# Patient Record
Sex: FEMALE | Race: ASIAN | NOT HISPANIC OR LATINO
[De-identification: names, ages, dates, MRNs, and addresses within clinical notes are randomized per-mention and may not be internally consistent; named-entity substitution may affect disease eponyms.]

---

## 2020-08-17 PROBLEM — Z00.00 ENCOUNTER FOR PREVENTIVE HEALTH EXAMINATION: Status: ACTIVE | Noted: 2020-08-17

## 2020-08-21 ENCOUNTER — APPOINTMENT (OUTPATIENT)
Dept: OBGYN | Facility: CLINIC | Age: 20
End: 2020-08-21
Payer: MEDICAID

## 2020-08-21 ENCOUNTER — ASOB RESULT (OUTPATIENT)
Age: 20
End: 2020-08-21

## 2020-08-21 VITALS
HEART RATE: 63 BPM | WEIGHT: 116 LBS | TEMPERATURE: 98 F | HEIGHT: 66 IN | BODY MASS INDEX: 18.64 KG/M2 | DIASTOLIC BLOOD PRESSURE: 69 MMHG | SYSTOLIC BLOOD PRESSURE: 107 MMHG

## 2020-08-21 DIAGNOSIS — N92.6 IRREGULAR MENSTRUATION, UNSPECIFIED: ICD-10-CM

## 2020-08-21 DIAGNOSIS — Z11.3 ENCOUNTER FOR SCREENING FOR INFECTIONS WITH A PREDOMINANTLY SEXUAL MODE OF TRANSMISSION: ICD-10-CM

## 2020-08-21 DIAGNOSIS — Z86.79 PERSONAL HISTORY OF OTHER DISEASES OF THE CIRCULATORY SYSTEM: ICD-10-CM

## 2020-08-21 DIAGNOSIS — Z87.891 PERSONAL HISTORY OF NICOTINE DEPENDENCE: ICD-10-CM

## 2020-08-21 DIAGNOSIS — Z80.3 FAMILY HISTORY OF MALIGNANT NEOPLASM OF BREAST: ICD-10-CM

## 2020-08-21 DIAGNOSIS — Z78.9 OTHER SPECIFIED HEALTH STATUS: ICD-10-CM

## 2020-08-21 PROCEDURE — 99385 PREV VISIT NEW AGE 18-39: CPT | Mod: 25

## 2020-08-21 PROCEDURE — 90651 9VHPV VACCINE 2/3 DOSE IM: CPT

## 2020-08-21 PROCEDURE — 76830 TRANSVAGINAL US NON-OB: CPT

## 2020-08-21 PROCEDURE — 90471 IMMUNIZATION ADMIN: CPT

## 2020-08-21 PROCEDURE — 81025 URINE PREGNANCY TEST: CPT

## 2020-08-21 PROCEDURE — 76857 US EXAM PELVIC LIMITED: CPT | Mod: 59

## 2020-08-23 LAB
ESTRADIOL SERPL-MCNC: 81 PG/ML
FSH SERPL-MCNC: 4.3 IU/L
HBV SURFACE AG SER QL: NONREACTIVE
HCG UR QL: NEGATIVE
HCV AB SER QL: NONREACTIVE
HCV S/CO RATIO: 0.19 S/CO
HIV1+2 AB SPEC QL IA.RAPID: NONREACTIVE
LH SERPL-ACNC: 21 IU/L
PROGEST SERPL-MCNC: 0.4 NG/ML
PROLACTIN SERPL-MCNC: 7.2 NG/ML
QUALITY CONTROL: YES
T PALLIDUM AB SER QL IA: NEGATIVE
TSH SERPL-ACNC: 2.72 UIU/ML

## 2020-08-25 ENCOUNTER — TRANSCRIPTION ENCOUNTER (OUTPATIENT)
Age: 20
End: 2020-08-25

## 2020-08-25 PROBLEM — Z87.891 CESSATION OF TOBACCO USE IN PREVIOUS 12 MONTHS: Status: ACTIVE | Noted: 2020-08-25

## 2020-08-25 PROBLEM — Z78.9 EXERCISES OCCASIONALLY: Status: ACTIVE | Noted: 2020-08-21

## 2020-08-25 PROBLEM — Z86.79 HISTORY OF HYPOTENSION: Status: RESOLVED | Noted: 2020-08-25 | Resolved: 2020-08-25

## 2020-08-25 PROBLEM — Z11.3 SCREENING FOR STD (SEXUALLY TRANSMITTED DISEASE): Status: ACTIVE | Noted: 2020-08-25

## 2020-08-25 PROBLEM — N92.6 IRREGULAR PERIODS/MENSTRUAL CYCLES: Status: ACTIVE | Noted: 2020-08-21

## 2020-08-25 PROBLEM — Z80.3 FAMILY HISTORY OF MALIGNANT NEOPLASM OF BREAST: Status: ACTIVE | Noted: 2020-08-21

## 2020-08-25 LAB
C TRACH RRNA SPEC QL NAA+PROBE: NOT DETECTED
N GONORRHOEA RRNA SPEC QL NAA+PROBE: NOT DETECTED
SOURCE AMPLIFICATION: NORMAL

## 2020-08-25 NOTE — CHIEF COMPLAINT
[Initial Visit] : initial GYN visit [FreeTextEntry1] : Pt is here for initial annual exam. Overall pt feels good, denies pelvic pain. Pt states she has vaginal discharge and some odor for 1 month. Pt also has an irregular cycles with a normal flow , but pt states she has very bad cramping the first 2 days of cycle. \par \par Gardasil injection: \par NDC:[id number]\par Lot#: [id number]\par Exp: [id number]\par

## 2020-08-25 NOTE — PHYSICAL EXAM
[Awake] : awake [Alert] : alert [Acute Distress] : no acute distress [LAD] : no lymphadenopathy [Mass] : no breast mass [Nipple Discharge] : no nipple discharge [Axillary LAD] : no axillary lymphadenopathy [Soft] : soft [Tender] : non tender [Distended] : not distended [Oriented x3] : oriented to person, place, and time [Depressed Mood] : not depressed [No Lesions] : no genitalia lesions [Normal] : external genitalia [Labia Majora] : labia major [Labia Minora] : labia minora [Pink Rugae] : pink rugae [Discharge] : had no discharge [Pap Obtained] : a Pap smear was performed [Motion Tenderness] : there was no cervical motion tenderness [Tenderness] : nontender [Enlarged ___ wks] : not enlarged [Mass ___ cm] : no uterine mass was palpated [Uterine Adnexae] : were not tender and not enlarged

## 2020-08-25 NOTE — HISTORY OF PRESENT ILLNESS
[Definite:  ___ (Date)] : the last menstrual period was [unfilled] [Sexually Active] : is sexually active [Menarche Age: ____] : age at menarche was [unfilled] [Male ___] : [unfilled] male [Yes] : yes [Regular Cycle Intervals] : periods have been irregular [Contraception] : does not use contraception

## 2020-08-26 LAB
HPV HIGH+LOW RISK DNA PNL CVX: NOT DETECTED
SOURCE AMPLIFICATION: NORMAL
T VAGINALIS RRNA SPEC QL NAA+PROBE: NOT DETECTED

## 2020-08-27 LAB
TESTOST BND SERPL-MCNC: 2.5 PG/ML
TESTOST SERPL-MCNC: 48.8 NG/DL

## 2020-08-28 LAB — ESTROGEN SERPL-MCNC: 195 PG/ML

## 2020-09-04 RX ORDER — FLUCONAZOLE 150 MG/1
150 TABLET ORAL
Qty: 1 | Refills: 1 | Status: ACTIVE | COMMUNITY
Start: 2020-09-04 | End: 1900-01-01

## 2020-09-04 RX ORDER — TERCONAZOLE 8 MG/G
0.8 CREAM VAGINAL
Qty: 1 | Refills: 1 | Status: ACTIVE | COMMUNITY
Start: 2020-09-04 | End: 1900-01-01

## 2020-10-23 ENCOUNTER — LABORATORY RESULT (OUTPATIENT)
Age: 20
End: 2020-10-23

## 2020-10-23 ENCOUNTER — APPOINTMENT (OUTPATIENT)
Dept: OBGYN | Facility: CLINIC | Age: 20
End: 2020-10-23
Payer: MEDICAID

## 2020-10-23 VITALS
WEIGHT: 116 LBS | DIASTOLIC BLOOD PRESSURE: 72 MMHG | SYSTOLIC BLOOD PRESSURE: 93 MMHG | HEART RATE: 101 BPM | HEIGHT: 66 IN | TEMPERATURE: 96.3 F | BODY MASS INDEX: 18.64 KG/M2

## 2020-10-23 DIAGNOSIS — R39.15 URGENCY OF URINATION: ICD-10-CM

## 2020-10-23 DIAGNOSIS — Z86.19 PERSONAL HISTORY OF OTHER INFECTIOUS AND PARASITIC DISEASES: ICD-10-CM

## 2020-10-23 PROCEDURE — 99072 ADDL SUPL MATRL&STAF TM PHE: CPT

## 2020-10-23 PROCEDURE — 99213 OFFICE O/P EST LOW 20 MIN: CPT | Mod: 25

## 2020-10-23 PROCEDURE — 81025 URINE PREGNANCY TEST: CPT

## 2020-10-23 PROCEDURE — 90649 4VHPV VACCINE 3 DOSE IM: CPT

## 2020-10-23 PROCEDURE — 90471 IMMUNIZATION ADMIN: CPT

## 2020-10-23 PROCEDURE — 81003 URINALYSIS AUTO W/O SCOPE: CPT | Mod: QW

## 2020-10-23 RX ORDER — FLUCONAZOLE 150 MG/1
150 TABLET ORAL
Qty: 1 | Refills: 0 | Status: ACTIVE | COMMUNITY
Start: 2020-10-23 | End: 1900-01-01

## 2020-10-24 LAB
BILIRUB UR QL STRIP: NORMAL
CLARITY UR: CLEAR
COLLECTION METHOD: NORMAL
GLUCOSE UR-MCNC: NORMAL
HCG UR QL: 0.2 EU/DL
HCG UR QL: NEGATIVE
HGB UR QL STRIP.AUTO: NORMAL
KETONES UR-MCNC: NORMAL
LEUKOCYTE ESTERASE UR QL STRIP: ABNORMAL
NITRITE UR QL STRIP: NORMAL
PH UR STRIP: 7.5
PROT UR STRIP-MCNC: NORMAL
QUALITY CONTROL: YES
SP GR UR STRIP: 1.02

## 2020-10-27 LAB
A VAGINAE DNA VAG QL NAA+PROBE: NORMAL
BACTERIA UR CULT: NORMAL
BVAB2 DNA VAG QL NAA+PROBE: NORMAL
C KRUSEI DNA VAG QL NAA+PROBE: NEGATIVE
C TRACH RRNA SPEC QL NAA+PROBE: NEGATIVE
MEGA1 DNA VAG QL NAA+PROBE: NORMAL
N GONORRHOEA RRNA SPEC QL NAA+PROBE: NEGATIVE
T VAGINALIS RRNA SPEC QL NAA+PROBE: NEGATIVE

## 2020-10-30 ENCOUNTER — NON-APPOINTMENT (OUTPATIENT)
Age: 20
End: 2020-10-30

## 2020-11-01 NOTE — HISTORY OF PRESENT ILLNESS
[FreeTextEntry1] : Pt is here for a follow-up for her #2 Gardasil injection, pt has c/o of white discharge, odor, and some itchiness near clitoris, and pt also states that last week pt had an urge to urinate and only a few droplets came out. \par \par Gardasil: NDC- [id number]\par Lot#- [id number]\par Exp: Jul 27 2021\par \par Pregnancy test- negative  [TextBox_4] : 19yo G0 came for gardasil #2 vaccine but also c/o some vaginal discharge with pruritus and some urinary frequency with urge to void and nothing voided.  She denies dysuria or pelvic pain.  She denies fever/N/V or other complaints.  \par \par ucg negative

## 2020-11-01 NOTE — PHYSICAL EXAM
[Appropriately responsive] : appropriately responsive [Alert] : alert [No Acute Distress] : no acute distress [Oriented x3] : oriented x3 [Normal] : normal external genitalia [Labia Majora] : normal [Labia Minora] : normal [Tenderness] : nontender [Enlarged ___ wks] : not enlarged [Mass ___ cm] : no uterine mass was palpated [Uterine Adnexae] : normal [FreeTextEntry4] : scant white discharge, no kenya, gen cx done [FreeTextEntry5] : no CMT, NT exam

## 2020-11-01 NOTE — COUNSELING
[Nutrition/ Exercise/ Weight Management] : nutrition, exercise, weight management [Vitamins/Supplements] : vitamins/supplements [Bladder Hygiene] : bladder hygiene [Contraception/ Emergency Contraception/ Safe Sexual Practices] : contraception, emergency contraception, safe sexual practices [HPV Vaccine] : HPV Vaccine [Medication Management] : medication management

## 2020-11-01 NOTE — DISCUSSION/SUMMARY
[FreeTextEntry1] : A: 21yo with candida, r/o UTI, HPV vaccine\par \par P: pelvic exam done\par     gen cx done\par     diflucan rx given\par     safe sex practices discussed\par     UCx sent\par      encouraged healthy diet, low carb/sugar, increase oral hydration\par      pain and bleeding precautions\par      gardasil #2 given\par      f/u 4 months or PRN

## 2020-12-23 PROBLEM — Z86.19 HISTORY OF CANDIDIASIS OF VAGINA: Status: RESOLVED | Noted: 2020-09-04 | Resolved: 2020-12-23

## 2021-02-19 ENCOUNTER — APPOINTMENT (OUTPATIENT)
Dept: OBGYN | Facility: CLINIC | Age: 21
End: 2021-02-19
Payer: MEDICAID

## 2021-02-19 VITALS
SYSTOLIC BLOOD PRESSURE: 106 MMHG | HEART RATE: 89 BPM | TEMPERATURE: 98.1 F | WEIGHT: 118 LBS | DIASTOLIC BLOOD PRESSURE: 75 MMHG | HEIGHT: 66 IN | BODY MASS INDEX: 18.96 KG/M2

## 2021-02-19 DIAGNOSIS — Z23 ENCOUNTER FOR IMMUNIZATION: ICD-10-CM

## 2021-02-19 LAB
HCG UR QL: NEGATIVE
QUALITY CONTROL: YES

## 2021-02-19 PROCEDURE — 99072 ADDL SUPL MATRL&STAF TM PHE: CPT

## 2021-02-19 PROCEDURE — 90471 IMMUNIZATION ADMIN: CPT

## 2021-02-19 PROCEDURE — 99213 OFFICE O/P EST LOW 20 MIN: CPT | Mod: 25

## 2021-02-19 PROCEDURE — 90649 4VHPV VACCINE 3 DOSE IM: CPT

## 2021-02-19 PROCEDURE — 81025 URINE PREGNANCY TEST: CPT

## 2021-02-20 PROBLEM — Z23 ENCOUNTER FOR IMMUNIZATION: Status: ACTIVE | Noted: 2020-08-21

## 2021-02-20 NOTE — HISTORY OF PRESENT ILLNESS
[Patient reported PAP Smear was normal] : Patient reported PAP Smear was normal [Normal Amount/Duration] :  normal amount and duration [Currently Active] : currently active [Men] : men [Vaginal] : vaginal [TextBox_4] : 22yo P0 came fro third gardasil HPV vaccine and also would like to discuss contraception mgmt, especially IUD insertion.  She denies AUB, pelvic pain or other GYN complaints.  She has irregular cycles, ucg negative.  We discussed all contraception options, patient states she will think about options and if wants IUD, will order and return for insertion. [PapSmeardate] : 08/21/20 [LMPDate] : 01/11/21 [FreeTextEntry1] : 01/11/21 [No] : No

## 2021-02-20 NOTE — DISCUSSION/SUMMARY
[FreeTextEntry1] : A:22yo for HPV vaccine, contraception mgmt\par \par P: Gardasil HPV vaccine #3 given\par     safe sex practices\par     pain and bleeding precautions\par      contraception counseling done- will think about options\par      f/u PRN

## 2021-02-20 NOTE — COUNSELING
[Nutrition/ Exercise/ Weight Management] : nutrition, exercise, weight management [Vitamins/Supplements] : vitamins/supplements [Contraception/ Emergency Contraception/ Safe Sexual Practices] : contraception, emergency contraception, safe sexual practices [Bladder Hygiene] : bladder hygiene [STD (testing, results, tx)] : STD (testing, results, tx) [HPV Vaccine] : HPV Vaccine [Medication Management] : medication management

## 2021-05-03 ENCOUNTER — APPOINTMENT (OUTPATIENT)
Dept: OBGYN | Facility: CLINIC | Age: 21
End: 2021-05-03
Payer: MEDICAID

## 2021-05-03 VITALS
TEMPERATURE: 97.9 F | HEIGHT: 66 IN | HEART RATE: 98 BPM | SYSTOLIC BLOOD PRESSURE: 98 MMHG | DIASTOLIC BLOOD PRESSURE: 62 MMHG | WEIGHT: 116 LBS | BODY MASS INDEX: 18.64 KG/M2

## 2021-05-03 DIAGNOSIS — N92.6 IRREGULAR MENSTRUATION, UNSPECIFIED: ICD-10-CM

## 2021-05-03 DIAGNOSIS — R35.0 FREQUENCY OF MICTURITION: ICD-10-CM

## 2021-05-03 DIAGNOSIS — R30.0 DYSURIA: ICD-10-CM

## 2021-05-03 LAB
BILIRUB UR QL STRIP: NORMAL
CLARITY UR: CLEAR
COLLECTION METHOD: NORMAL
GLUCOSE UR-MCNC: NORMAL
HCG UR QL: 0.2 EU/DL
HCG UR QL: NEGATIVE
HGB UR QL STRIP.AUTO: NORMAL
KETONES UR-MCNC: NORMAL
LEUKOCYTE ESTERASE UR QL STRIP: NORMAL
NITRITE UR QL STRIP: NORMAL
PH UR STRIP: 5.5
PROT UR STRIP-MCNC: NORMAL
QUALITY CONTROL: YES
SP GR UR STRIP: 1.02

## 2021-05-03 PROCEDURE — 99072 ADDL SUPL MATRL&STAF TM PHE: CPT

## 2021-05-03 PROCEDURE — 81003 URINALYSIS AUTO W/O SCOPE: CPT | Mod: QW

## 2021-05-03 PROCEDURE — 99213 OFFICE O/P EST LOW 20 MIN: CPT

## 2021-05-03 PROCEDURE — 81025 URINE PREGNANCY TEST: CPT

## 2021-05-09 PROBLEM — N92.6 IRREGULAR MENSTRUAL BLEEDING: Status: ACTIVE | Noted: 2021-05-09

## 2021-05-09 PROBLEM — R35.0 FREQUENT URINATION: Status: ACTIVE | Noted: 2021-05-03

## 2021-05-09 NOTE — COUNSELING
[Nutrition/ Exercise/ Weight Management] : nutrition, exercise, weight management [Vitamins/Supplements] : vitamins/supplements [Bladder Hygiene] : bladder hygiene [Contraception/ Emergency Contraception/ Safe Sexual Practices] : contraception, emergency contraception, safe sexual practices [STD (testing, results, tx)] : STD (testing, results, tx) [Medication Management] : medication management

## 2021-05-09 NOTE — PHYSICAL EXAM
[Appropriately responsive] : appropriately responsive [Alert] : alert [No Acute Distress] : no acute distress [Soft] : soft [Non-distended] : non-distended [Non-tender] : non-tender [No Mass] : no mass [Oriented x3] : oriented x3 [Normal] : normal external genitalia [No Lesions] : no lesions  [Labia Majora] : normal [Labia Minora] : normal [No Bleeding] : There was no active vaginal bleeding [Tenderness] : nontender [Mass ___ cm] : no uterine mass was palpated [Enlarged ___ wks] : not enlarged [Uterine Adnexae] : normal [FreeTextEntry5] : gen cx done

## 2021-05-09 NOTE — DISCUSSION/SUMMARY
[FreeTextEntry1] : A: 22yo with dysuria, oligomenorrhea with irregular menstrual bleeding, ovarian lesion\par \par P: GYN exam done\par     gen Cx and UCx sent\par     safe sex practices\par     contraception counseling done - nothing wanted at this time\par     menstrual diary\par     referral for pelvic sonogram\par     f/u after imaging or PRN

## 2021-05-09 NOTE — HISTORY OF PRESENT ILLNESS
[Menarche Age: ____] : age at menarche was [unfilled] [Patient reported PAP Smear was normal] : Patient reported PAP Smear was normal [Gonorrhea test offered] : Gonorrhea test offered [Chlamydia test offered] : Chlamydia test offered [Trichomonas test offered] : Trichomonas test offered [Y] : Patient is sexually active [N] : Patient denies prior pregnancies [Currently Active] : currently active [No] : No [Men] : men [TextBox_4] : 22yo with LMP 4/16/21 came for evaluation of urinary frequency and some burning, but also concerned about irregular menstruation.   was zachariah 11-15, march 1-5, april 4-8, had intercourse (1st time in 3 months), then spotting next week then another 4/16-19, never had twice in one month before, she usually has oligomenorrhea.  She had sonogram 8/2020 that showed a focus in the ovary but not repeated.  She was counseled previously and again today about contraception mgmt for menstrual regulation, but patient does not want any intervention at this time.  \par \par ucg negative [PapSmeardate] : 8/2020 [PGHxTotal] : 0 [FreeTextEntry1] : 4/4/21

## 2021-05-27 ENCOUNTER — NON-APPOINTMENT (OUTPATIENT)
Age: 21
End: 2021-05-27

## 2021-10-06 ENCOUNTER — APPOINTMENT (OUTPATIENT)
Dept: OBGYN | Facility: CLINIC | Age: 21
End: 2021-10-06
Payer: MEDICAID

## 2021-10-06 VITALS
SYSTOLIC BLOOD PRESSURE: 90 MMHG | HEART RATE: 81 BPM | WEIGHT: 114 LBS | DIASTOLIC BLOOD PRESSURE: 63 MMHG | TEMPERATURE: 97.8 F | BODY MASS INDEX: 18.32 KG/M2 | HEIGHT: 66 IN

## 2021-10-06 DIAGNOSIS — Z01.419 ENCOUNTER FOR GYNECOLOGICAL EXAMINATION (GENERAL) (ROUTINE) W/OUT ABNORMAL FINDINGS: ICD-10-CM

## 2021-10-06 DIAGNOSIS — Z12.4 ENCOUNTER FOR SCREENING FOR MALIGNANT NEOPLASM OF CERVIX: ICD-10-CM

## 2021-10-06 DIAGNOSIS — Z71.89 OTHER SPECIFIED COUNSELING: ICD-10-CM

## 2021-10-06 DIAGNOSIS — N83.201 UNSPECIFIED OVARIAN CYST, RIGHT SIDE: ICD-10-CM

## 2021-10-06 DIAGNOSIS — U07.1 COVID-19: ICD-10-CM

## 2021-10-06 DIAGNOSIS — N91.5 OLIGOMENORRHEA, UNSPECIFIED: ICD-10-CM

## 2021-10-06 DIAGNOSIS — Z30.09 ENCOUNTER FOR OTHER GENERAL COUNSELING AND ADVICE ON CONTRACEPTION: ICD-10-CM

## 2021-10-06 PROCEDURE — 99395 PREV VISIT EST AGE 18-39: CPT

## 2021-10-08 LAB
C TRACH RRNA SPEC QL NAA+PROBE: NOT DETECTED
HPV HIGH+LOW RISK DNA PNL CVX: NOT DETECTED
N GONORRHOEA RRNA SPEC QL NAA+PROBE: NOT DETECTED
SOURCE AMPLIFICATION: NORMAL
SOURCE AMPLIFICATION: NORMAL
T VAGINALIS RRNA SPEC QL NAA+PROBE: NOT DETECTED

## 2021-10-17 LAB — CYTOLOGY CVX/VAG DOC THIN PREP: ABNORMAL

## 2021-10-18 ENCOUNTER — NON-APPOINTMENT (OUTPATIENT)
Age: 21
End: 2021-10-18

## 2021-10-20 ENCOUNTER — APPOINTMENT (OUTPATIENT)
Dept: OBGYN | Facility: CLINIC | Age: 21
End: 2021-10-20
Payer: MEDICAID

## 2021-10-20 DIAGNOSIS — N94.6 DYSMENORRHEA, UNSPECIFIED: ICD-10-CM

## 2021-10-20 DIAGNOSIS — N92.6 IRREGULAR MENSTRUATION, UNSPECIFIED: ICD-10-CM

## 2021-10-20 DIAGNOSIS — N83.9 NONINFLAMMATORY DISORDER OF OVARY, FALLOPIAN TUBE AND BROAD LIGAMENT, UNSPECIFIED: ICD-10-CM

## 2021-10-20 DIAGNOSIS — R87.610 ATYPICAL SQUAMOUS CELLS OF UNDETERMINED SIGNIFICANCE ON CYTOLOGIC SMEAR OF CERVIX (ASC-US): ICD-10-CM

## 2021-10-20 PROCEDURE — 99442: CPT

## 2021-10-24 PROBLEM — N92.6 IRREGULAR MENSTRUAL CYCLE: Status: ACTIVE | Noted: 2020-08-21

## 2021-10-24 PROBLEM — N94.6 DYSMENORRHEA: Status: ACTIVE | Noted: 2020-08-25

## 2021-10-24 PROBLEM — N83.9 LESION OF OVARY: Status: ACTIVE | Noted: 2020-08-25

## 2021-10-24 PROBLEM — R87.610 ASCUS OF CERVIX WITH NEGATIVE HIGH RISK HPV: Status: ACTIVE | Noted: 2021-10-24

## 2021-11-11 ENCOUNTER — NON-APPOINTMENT (OUTPATIENT)
Age: 21
End: 2021-11-11

## 2021-11-27 PROBLEM — N91.5 OLIGOMENORRHEA: Status: ACTIVE | Noted: 2021-05-09

## 2021-11-27 PROBLEM — N83.201 CYST OF RIGHT OVARY: Status: ACTIVE | Noted: 2021-11-27

## 2021-11-27 PROBLEM — U07.1 COVID-19: Status: RESOLVED | Noted: 2021-11-27 | Resolved: 2021-11-27

## 2021-11-27 PROBLEM — Z12.4 ENCOUNTER FOR SCREENING FOR CERVICAL CANCER: Status: ACTIVE | Noted: 2020-08-21

## 2021-11-27 PROBLEM — Z71.89 OTHER SPECIFIED COUNSELING: Status: ACTIVE | Noted: 2020-08-25

## 2021-11-27 PROBLEM — Z01.419 WOMEN'S ANNUAL ROUTINE GYNECOLOGICAL EXAMINATION: Status: ACTIVE | Noted: 2020-08-21

## 2021-11-27 PROBLEM — Z30.09 GENERAL COUNSELING AND ADVICE ON CONTRACEPTIVE MANAGEMENT: Status: ACTIVE | Noted: 2020-08-25

## 2021-11-27 NOTE — HISTORY OF PRESENT ILLNESS
[Patient reported PAP Smear was abnormal] : Patient reported PAP Smear was abnormal [Y] : Patient reports abnormal menses [Irregular Cycle Intervals] : are  irregular [Frequency: Q ___ days] : menstrual periods occur approximately every [unfilled] days [Menarche Age: ____] : age at menarche was [unfilled] [No] : No [Gonorrhea test offered] : Gonorrhea test offered [Chlamydia test offered] : Chlamydia test offered [Trichomonas test offered] : Trichomonas test offered [HPV test offered] : HPV test offered [N] : Patient is not sexually active [Previously active] : previously active [Men] : men [TextBox_4] : 20yo G0 with LMP 9/24/21 came for annual GYN exam and pap smear. She states missed menses in July, had covid in August and menses has resumed without any residual sequelae. She denies pelvic pain, discharge or dysuria. She previously had sonogram of pelvis and repeated tin to questionable lesions/calcification of right ovary. Due to discrepancy and for further evaluation/potential dermoid, patient counseled and will be given MRI referral.  She denies h/o abnl pap.HPV, STDs fibroids or cysts.  She is not currently sexually active. She has received the 3 gardasil vaccines.   [PapSmeardate] : 8/2020 [LMPDate] : 9/24/21 [MensesFreq] : 308-57 [MensesLength] : 5 [MensesAmount] : heavy to light  [PGHxTotal] : 0 [FreeTextEntry1] : 9/24/21

## 2021-11-27 NOTE — COUNSELING
[Nutrition/ Exercise/ Weight Management] : nutrition, exercise, weight management [Vitamins/Supplements] : vitamins/supplements [Breast Self Exam] : breast self exam [Contraception/ Emergency Contraception/ Safe Sexual Practices] : contraception, emergency contraception, safe sexual practices [STD (testing, results, tx)] : STD (testing, results, tx) [Lab Results] : lab results [Medication Management] : medication management

## 2021-11-27 NOTE — DISCUSSION/SUMMARY
[FreeTextEntry1] : A: 20yo for annual GYN exam, oligomenorrhea, ovarian cyst/calcifications?\par \par P: GYN exam done\par     pap smear done\par     safe  sex practices if active\par     contraception counseling done/menstrual regulation- nothing wanted at this time\par     menstrual diary\par     pain and bleeding precautions\par     referral fro pelvic MRI\par     encourage healthy diet and exercise\par     f/u after imaging or PRN

## 2022-03-28 ENCOUNTER — OUTPATIENT (OUTPATIENT)
Dept: OUTPATIENT SERVICES | Facility: HOSPITAL | Age: 22
LOS: 1 days | Discharge: HOME | End: 2022-03-28
Payer: COMMERCIAL

## 2022-03-28 DIAGNOSIS — Z00.8 ENCOUNTER FOR OTHER GENERAL EXAMINATION: ICD-10-CM

## 2022-03-29 PROCEDURE — 71046 X-RAY EXAM CHEST 2 VIEWS: CPT | Mod: 26

## 2022-04-09 ENCOUNTER — TRANSCRIPTION ENCOUNTER (OUTPATIENT)
Age: 22
End: 2022-04-09

## 2022-05-10 ENCOUNTER — APPOINTMENT (OUTPATIENT)
Dept: OBGYN | Facility: CLINIC | Age: 22
End: 2022-05-10

## 2022-07-16 ENCOUNTER — APPOINTMENT (OUTPATIENT)
Dept: ORTHOPEDIC SURGERY | Facility: CLINIC | Age: 22
End: 2022-07-16

## 2022-07-16 VITALS — HEIGHT: 66 IN | BODY MASS INDEX: 19.29 KG/M2 | WEIGHT: 120 LBS

## 2022-07-16 DIAGNOSIS — S92.591A OTHER FRACTURE OF RIGHT LESSER TOE(S), INITIAL ENCOUNTER FOR CLOSED FRACTURE: ICD-10-CM

## 2022-07-16 PROCEDURE — 99203 OFFICE O/P NEW LOW 30 MIN: CPT

## 2022-07-16 PROCEDURE — 73630 X-RAY EXAM OF FOOT: CPT | Mod: RT

## 2022-07-16 PROCEDURE — 99213 OFFICE O/P EST LOW 20 MIN: CPT

## 2022-07-16 NOTE — DISCUSSION/SUMMARY
[de-identified] :   I reviewed the x-rays with the patient.  She will continue buddy taping.  She can weightbear as tolerated.  We will see her back on a as needed basis for this.

## 2022-07-16 NOTE — DATA REVIEWED
[Right] : of the right [Foot] : foot [FreeTextEntry1] :   X-rays show good callus formation around the fracture site at the middle phalanx of the 2nd toe.

## 2022-07-16 NOTE — HISTORY OF PRESENT ILLNESS
[de-identified] : The patient is a 22-year-old female here for evaluation of her right 2nd toe.  While she was wrestling on 06/10/2022 she injured the toe.  She had x-rays at [facility] on 07/01/2022 which showed a minimally displaced fracture of the middle phalanx of the right 2nd toe.  Her pain has been decreasing in severity.  She has been pola taping.

## 2022-07-16 NOTE — PHYSICAL EXAM
[Right] : right foot and ankle [] : patient ambulates without assistive device [FreeTextEntry3] :   No edema or ecchymosis.  No erythema. [FreeTextEntry8] :   Mild tenderness to palpation over the middle phalanx of the right 2nd toe. [FreeTextEntry9] :   Full range of motion of the right ankle and foot.

## 2022-07-18 ENCOUNTER — APPOINTMENT (OUTPATIENT)
Dept: OBGYN | Facility: CLINIC | Age: 22
End: 2022-07-18